# Patient Record
Sex: MALE | Race: WHITE | Employment: OTHER | URBAN - METROPOLITAN AREA
[De-identification: names, ages, dates, MRNs, and addresses within clinical notes are randomized per-mention and may not be internally consistent; named-entity substitution may affect disease eponyms.]

---

## 2021-06-23 ENCOUNTER — HOSPITAL ENCOUNTER (OUTPATIENT)
Dept: CT IMAGING | Age: 56
Discharge: HOME | End: 2021-06-23
Attending: NURSE PRACTITIONER

## 2021-06-23 DIAGNOSIS — R51.9 INTRACTABLE HEADACHE, UNSPECIFIED CHRONICITY PATTERN, UNSPECIFIED HEADACHE TYPE: ICD-10-CM

## 2021-06-23 PROCEDURE — 70450 CT HEAD/BRAIN W/O DYE: CPT

## 2021-06-23 NOTE — PROGRESS NOTES
CT IMPRESSION was  Negative for acute intracranial abnormality.    I will send in med for headache  Thanks

## 2022-01-06 PROBLEM — Z76.89 ENCOUNTER TO ESTABLISH CARE: Status: ACTIVE | Noted: 2022-01-06

## 2022-01-06 PROBLEM — F32.A DEPRESSION: Status: ACTIVE | Noted: 2022-01-06

## 2022-01-06 PROBLEM — G43.909 MIGRAINE: Status: ACTIVE | Noted: 2022-01-06

## 2022-03-19 PROBLEM — Z76.89 ENCOUNTER TO ESTABLISH CARE: Status: ACTIVE | Noted: 2022-01-06

## 2022-03-19 PROBLEM — F32.A DEPRESSION: Status: ACTIVE | Noted: 2022-01-06

## 2022-03-24 PROBLEM — K21.9 GASTROESOPHAGEAL REFLUX DISEASE WITHOUT ESOPHAGITIS: Status: ACTIVE | Noted: 2022-03-24

## 2022-06-28 ENCOUNTER — OFFICE VISIT (OUTPATIENT)
Dept: INTERNAL MEDICINE CLINIC | Facility: CLINIC | Age: 57
End: 2022-06-28
Payer: COMMERCIAL

## 2022-06-28 VITALS
RESPIRATION RATE: 12 BRPM | HEIGHT: 70 IN | HEART RATE: 59 BPM | DIASTOLIC BLOOD PRESSURE: 80 MMHG | TEMPERATURE: 98.4 F | BODY MASS INDEX: 30.78 KG/M2 | OXYGEN SATURATION: 98 % | WEIGHT: 215 LBS | SYSTOLIC BLOOD PRESSURE: 135 MMHG

## 2022-06-28 DIAGNOSIS — F32.A DEPRESSION, UNSPECIFIED DEPRESSION TYPE: ICD-10-CM

## 2022-06-28 DIAGNOSIS — K21.9 GASTROESOPHAGEAL REFLUX DISEASE WITHOUT ESOPHAGITIS: Primary | ICD-10-CM

## 2022-06-28 PROCEDURE — 99213 OFFICE O/P EST LOW 20 MIN: CPT | Performed by: INTERNAL MEDICINE

## 2022-06-28 RX ORDER — CITALOPRAM 40 MG/1
40 TABLET ORAL DAILY
Qty: 90 TABLET | Refills: 3 | Status: SHIPPED | OUTPATIENT
Start: 2022-06-28 | End: 2023-06-23

## 2022-06-28 RX ORDER — OMEPRAZOLE 20 MG/1
20 CAPSULE, DELAYED RELEASE ORAL DAILY
Qty: 90 CAPSULE | Refills: 3 | Status: SHIPPED | OUTPATIENT
Start: 2022-06-28 | End: 2023-06-23

## 2022-06-28 ASSESSMENT — PATIENT HEALTH QUESTIONNAIRE - PHQ9
1. LITTLE INTEREST OR PLEASURE IN DOING THINGS: 0
3. TROUBLE FALLING OR STAYING ASLEEP: 0
6. FEELING BAD ABOUT YOURSELF - OR THAT YOU ARE A FAILURE OR HAVE LET YOURSELF OR YOUR FAMILY DOWN: 0
4. FEELING TIRED OR HAVING LITTLE ENERGY: 0
8. MOVING OR SPEAKING SO SLOWLY THAT OTHER PEOPLE COULD HAVE NOTICED. OR THE OPPOSITE, BEING SO FIGETY OR RESTLESS THAT YOU HAVE BEEN MOVING AROUND A LOT MORE THAN USUAL: 0
SUM OF ALL RESPONSES TO PHQ9 QUESTIONS 1 & 2: 0
2. FEELING DOWN, DEPRESSED OR HOPELESS: 0
10. IF YOU CHECKED OFF ANY PROBLEMS, HOW DIFFICULT HAVE THESE PROBLEMS MADE IT FOR YOU TO DO YOUR WORK, TAKE CARE OF THINGS AT HOME, OR GET ALONG WITH OTHER PEOPLE: 0
9. THOUGHTS THAT YOU WOULD BE BETTER OFF DEAD, OR OF HURTING YOURSELF: 0
SUM OF ALL RESPONSES TO PHQ QUESTIONS 1-9: 0
7. TROUBLE CONCENTRATING ON THINGS, SUCH AS READING THE NEWSPAPER OR WATCHING TELEVISION: 0
5. POOR APPETITE OR OVEREATING: 0
SUM OF ALL RESPONSES TO PHQ QUESTIONS 1-9: 0

## 2022-06-28 ASSESSMENT — ENCOUNTER SYMPTOMS
GASTROINTESTINAL NEGATIVE: 1
RESPIRATORY NEGATIVE: 1

## 2022-06-28 NOTE — PROGRESS NOTES
Zofia Huang D.O. [facility]  [address] [id number]  Tel: [phone]    Office Visit: Follow Up     Patient Name: Pat Salazar   :  1965   MRN:   [id number]      Today's Date: 22 11:31 AM    Subjective     The patient is a 59y.o.-year-old male who presents for follow-up for HTN, depression, and GERD. He states he is doing well with no new complaints. He states that his citalopram is still doing well for his depression and his omeprazole is still controlling his GERD. Review of Systems   Constitutional: Negative. HENT: Negative. Respiratory: Negative. Cardiovascular: Negative. Gastrointestinal: Negative. Genitourinary: Negative. Musculoskeletal: Negative. Neurological: Negative. Past Medical History:   Diagnosis Date    Depression      Social History     Socioeconomic History    Marital status:      Spouse name: Not on file    Number of children: Not on file    Years of education: Not on file    Highest education level: Not on file   Occupational History    Not on file   Tobacco Use    Smoking status: Never Smoker    Smokeless tobacco: Never Used   Substance and Sexual Activity    Alcohol use: Not on file    Drug use: Not on file    Sexual activity: Not on file   Other Topics Concern    Not on file   Social History Narrative    Not on file     Social Determinants of Health     Financial Resource Strain:     Difficulty of Paying Living Expenses: Not on file   Food Insecurity:     Worried About Running Out of Food in the Last Year: Not on file    Tisha of Food in the Last Year: Not on file   Transportation Needs:     Lack of Transportation (Medical): Not on file    Lack of Transportation (Non-Medical):  Not on file   Physical Activity:     Days of Exercise per Week: Not on file    Minutes of Exercise per Session: Not on file   Stress:     Feeling of Stress : Not on file   Social Connections:  Frequency of Communication with Friends and Family: Not on file    Frequency of Social Gatherings with Friends and Family: Not on file    Attends Jain Services: Not on file    Active Member of Clubs or Organizations: Not on file    Attends Club or Organization Meetings: Not on file    Marital Status: Not on file   Intimate Partner Violence:     Fear of Current or Ex-Partner: Not on file    Emotionally Abused: Not on file    Physically Abused: Not on file    Sexually Abused: Not on file   Housing Stability:     Unable to Pay for Housing in the Last Year: Not on file    Number of [place] in the Last Year: Not on file    Unstable Housing in the Last Year: Not on file         Current Outpatient Medications   Medication Sig    citalopram (CELEXA) 40 MG tablet Take 40 mg by mouth daily    omeprazole (PRILOSEC) 20 MG delayed release capsule Take 20 mg by mouth daily     No current facility-administered medications for this visit. Objective     Vitals:    06/28/22 1119   BP: 135/80   Site: Left Upper Arm   Position: Sitting   Cuff Size: Large Adult   Pulse: 59   Resp: 12   Temp: 98.4 °F (36.9 °C)   TempSrc: Temporal   SpO2: 98%   Weight: 215 lb (97.5 kg)   Height: 5' 10\" (1.778 m)        Physical Exam:  Constitutional: Appears well kempt. Alert/oriented x3. In no acute distress. Head: Normocephalic No trauma. No deformity. No bruits. Neck: Supple. ROM normal. No tenderness. No masses. Cardiac: Heart with normal rate/rhythm. No murmurs. No gallops. Pulses normal.   Pulmonary: Lungs clear to auscultation bilaterally. In no respiratory distress. No wheezing. No rales. No rhonci. Psychiatric: Normal thought content. Normal behavior. Normal judgment.      Recommendations     Assessment:  Patient Active Problem List   Diagnosis    Depression    Encounter to establish care    Gastroesophageal reflux disease without esophagitis      Plan:  -Patient's blood pressure is well controlled today dietary modification and lifestyle changes. He has lost 4 pounds since his I first saw him. He has no acute complaints today and he should continue doing what he is doing for weight loss and blood pressure.  -Refill medications  -The patient expresses understanding of the plan as I've explained it to him and is in agreement with the current plan. Follow Up:  Follow-up in 1 year    Signed: Ani Peguero D.O.  06/28/22  11:31 AM